# Patient Record
Sex: FEMALE | Race: WHITE | NOT HISPANIC OR LATINO | Employment: FULL TIME | URBAN - METROPOLITAN AREA
[De-identification: names, ages, dates, MRNs, and addresses within clinical notes are randomized per-mention and may not be internally consistent; named-entity substitution may affect disease eponyms.]

---

## 2022-06-02 ENCOUNTER — OFFICE VISIT (OUTPATIENT)
Dept: FAMILY MEDICINE | Facility: CLINIC | Age: 47
End: 2022-06-02
Payer: COMMERCIAL

## 2022-06-02 VITALS
HEIGHT: 59 IN | TEMPERATURE: 98 F | SYSTOLIC BLOOD PRESSURE: 138 MMHG | BODY MASS INDEX: 22.78 KG/M2 | OXYGEN SATURATION: 98 % | RESPIRATION RATE: 21 BRPM | WEIGHT: 113 LBS | HEART RATE: 85 BPM | DIASTOLIC BLOOD PRESSURE: 82 MMHG

## 2022-06-02 DIAGNOSIS — Z11.59 NEED FOR HEPATITIS C SCREENING TEST: ICD-10-CM

## 2022-06-02 DIAGNOSIS — Z12.31 VISIT FOR SCREENING MAMMOGRAM: ICD-10-CM

## 2022-06-02 DIAGNOSIS — Z12.4 CERVICAL CANCER SCREENING: ICD-10-CM

## 2022-06-02 DIAGNOSIS — R00.2 PALPITATIONS: ICD-10-CM

## 2022-06-02 DIAGNOSIS — Z13.220 SCREENING FOR HYPERLIPIDEMIA: ICD-10-CM

## 2022-06-02 DIAGNOSIS — Z13.29 SCREENING FOR ENDOCRINE, NUTRITIONAL, METABOLIC AND IMMUNITY DISORDER: ICD-10-CM

## 2022-06-02 DIAGNOSIS — Z00.00 VISIT FOR PREVENTIVE HEALTH EXAMINATION: Primary | ICD-10-CM

## 2022-06-02 DIAGNOSIS — B18.2 CHRONIC HEPATITIS C WITHOUT HEPATIC COMA (H): ICD-10-CM

## 2022-06-02 DIAGNOSIS — Z12.11 SCREEN FOR COLON CANCER: ICD-10-CM

## 2022-06-02 DIAGNOSIS — Z13.228 SCREENING FOR ENDOCRINE, NUTRITIONAL, METABOLIC AND IMMUNITY DISORDER: ICD-10-CM

## 2022-06-02 DIAGNOSIS — Z11.4 SCREENING FOR HIV (HUMAN IMMUNODEFICIENCY VIRUS): ICD-10-CM

## 2022-06-02 DIAGNOSIS — Z13.0 SCREENING FOR ENDOCRINE, NUTRITIONAL, METABOLIC AND IMMUNITY DISORDER: ICD-10-CM

## 2022-06-02 DIAGNOSIS — Z13.21 SCREENING FOR ENDOCRINE, NUTRITIONAL, METABOLIC AND IMMUNITY DISORDER: ICD-10-CM

## 2022-06-02 LAB
ALBUMIN SERPL-MCNC: 4.2 G/DL (ref 3.5–5)
ALP SERPL-CCNC: 70 U/L (ref 45–120)
ALT SERPL W P-5'-P-CCNC: 34 U/L (ref 0–45)
ANION GAP SERPL CALCULATED.3IONS-SCNC: 8 MMOL/L (ref 5–18)
AST SERPL W P-5'-P-CCNC: 27 U/L (ref 0–40)
BILIRUB SERPL-MCNC: 0.7 MG/DL (ref 0–1)
BUN SERPL-MCNC: 16 MG/DL (ref 8–22)
CALCIUM SERPL-MCNC: 9.4 MG/DL (ref 8.5–10.5)
CHLORIDE BLD-SCNC: 102 MMOL/L (ref 98–107)
CHOLEST SERPL-MCNC: 202 MG/DL
CO2 SERPL-SCNC: 30 MMOL/L (ref 22–31)
CREAT SERPL-MCNC: 0.81 MG/DL (ref 0.6–1.1)
ERYTHROCYTE [DISTWIDTH] IN BLOOD BY AUTOMATED COUNT: 12.3 % (ref 10–15)
FASTING STATUS PATIENT QL REPORTED: NO
GFR SERPL CREATININE-BSD FRML MDRD: 88 ML/MIN/1.73M2
GLUCOSE BLD-MCNC: 97 MG/DL (ref 70–125)
HCT VFR BLD AUTO: 39.6 % (ref 35–47)
HDLC SERPL-MCNC: 58 MG/DL
HGB BLD-MCNC: 13.3 G/DL (ref 11.7–15.7)
HIV 1+2 AB+HIV1 P24 AG SERPL QL IA: NEGATIVE
LDLC SERPL CALC-MCNC: 124 MG/DL
MCH RBC QN AUTO: 30.2 PG (ref 26.5–33)
MCHC RBC AUTO-ENTMCNC: 33.6 G/DL (ref 31.5–36.5)
MCV RBC AUTO: 90 FL (ref 78–100)
PLATELET # BLD AUTO: 280 10E3/UL (ref 150–450)
POTASSIUM BLD-SCNC: 4.3 MMOL/L (ref 3.5–5)
PROT SERPL-MCNC: 8 G/DL (ref 6–8)
RBC # BLD AUTO: 4.41 10E6/UL (ref 3.8–5.2)
SODIUM SERPL-SCNC: 140 MMOL/L (ref 136–145)
TRIGL SERPL-MCNC: 98 MG/DL
TSH SERPL DL<=0.005 MIU/L-ACNC: 2.48 UIU/ML (ref 0.3–5)
WBC # BLD AUTO: 8.3 10E3/UL (ref 4–11)

## 2022-06-02 PROCEDURE — 87491 CHLMYD TRACH DNA AMP PROBE: CPT | Performed by: FAMILY MEDICINE

## 2022-06-02 PROCEDURE — 87624 HPV HI-RISK TYP POOLED RSLT: CPT | Performed by: FAMILY MEDICINE

## 2022-06-02 PROCEDURE — 36415 COLL VENOUS BLD VENIPUNCTURE: CPT | Performed by: FAMILY MEDICINE

## 2022-06-02 PROCEDURE — 85027 COMPLETE CBC AUTOMATED: CPT | Performed by: FAMILY MEDICINE

## 2022-06-02 PROCEDURE — G0123 SCREEN CERV/VAG THIN LAYER: HCPCS | Performed by: FAMILY MEDICINE

## 2022-06-02 PROCEDURE — 86803 HEPATITIS C AB TEST: CPT | Performed by: FAMILY MEDICINE

## 2022-06-02 PROCEDURE — 80053 COMPREHEN METABOLIC PANEL: CPT | Performed by: FAMILY MEDICINE

## 2022-06-02 PROCEDURE — 99000 SPECIMEN HANDLING OFFICE-LAB: CPT | Performed by: FAMILY MEDICINE

## 2022-06-02 PROCEDURE — 87591 N.GONORRHOEAE DNA AMP PROB: CPT | Performed by: FAMILY MEDICINE

## 2022-06-02 PROCEDURE — 90471 IMMUNIZATION ADMIN: CPT | Performed by: FAMILY MEDICINE

## 2022-06-02 PROCEDURE — 87389 HIV-1 AG W/HIV-1&-2 AB AG IA: CPT | Performed by: FAMILY MEDICINE

## 2022-06-02 PROCEDURE — 80061 LIPID PANEL: CPT | Performed by: FAMILY MEDICINE

## 2022-06-02 PROCEDURE — 84443 ASSAY THYROID STIM HORMONE: CPT | Performed by: FAMILY MEDICINE

## 2022-06-02 PROCEDURE — 87902 NFCT AGT GNTYP ALYS HEP C: CPT | Mod: 90 | Performed by: FAMILY MEDICINE

## 2022-06-02 PROCEDURE — 99386 PREV VISIT NEW AGE 40-64: CPT | Mod: 25 | Performed by: FAMILY MEDICINE

## 2022-06-02 PROCEDURE — 90715 TDAP VACCINE 7 YRS/> IM: CPT | Performed by: FAMILY MEDICINE

## 2022-06-02 ASSESSMENT — ENCOUNTER SYMPTOMS
DIARRHEA: 0
HEADACHES: 0
BREAST MASS: 0
HEMATURIA: 0
SHORTNESS OF BREATH: 0
FREQUENCY: 1
NAUSEA: 0
HEARTBURN: 0
COUGH: 0
SORE THROAT: 0
PALPITATIONS: 1
CHILLS: 0
JOINT SWELLING: 0
ARTHRALGIAS: 0
DYSURIA: 0
EYE PAIN: 0
ABDOMINAL PAIN: 0
FEVER: 0
PARESTHESIAS: 0
DIZZINESS: 0
WEAKNESS: 0
CONSTIPATION: 1
NERVOUS/ANXIOUS: 0
MYALGIAS: 0
HEMATOCHEZIA: 0

## 2022-06-02 ASSESSMENT — PATIENT HEALTH QUESTIONNAIRE - PHQ9
10. IF YOU CHECKED OFF ANY PROBLEMS, HOW DIFFICULT HAVE THESE PROBLEMS MADE IT FOR YOU TO DO YOUR WORK, TAKE CARE OF THINGS AT HOME, OR GET ALONG WITH OTHER PEOPLE: NOT DIFFICULT AT ALL
SUM OF ALL RESPONSES TO PHQ QUESTIONS 1-9: 0
SUM OF ALL RESPONSES TO PHQ QUESTIONS 1-9: 0

## 2022-06-02 NOTE — LETTER
June 28, 2022      Yoli Diallo  [address] [place] [address]        Dear ,    We are writing to inform you of your test results.    Negative Cologuard test.  Repeat in 3 years.    Resulted Orders   COLOGUARD(EXACT SCIENCES)   Result Value Ref Range    COLOGUARD-ABSTRACT Negative Negative      Comment:        NEGATIVE TEST RESULT. A negative Cologuard result indicates a low likelihood that a colorectal cancer (CRC) or advanced adenoma (adenomatous polyps with more advanced pre-malignant features)  is present. The chance that a person with a negative Cologuard test has a colorectal cancer is less than 1 in 1500 (negative predictive value >99.9%) or has an  advanced adenoma is less than  5.3% (negative predictive value 94.7%). These data are based on a prospective cross-sectional study of 10,000 individuals at average risk for colorectal cancer who were screened with both Cologuard and colonoscopy. (Lovely DOOLEY. et al, N Engl J Med 2014;370(14):4998-7191) The normal value (reference range) for this assay is negative.    COLOGUARD RE-SCREENING RECOMMENDATION: Periodic colorectal cancer screening is an important part of preventive healthcare for asymptomatic individuals at average risk for colorectal cancer.  Following a negative Cologuard result, the American Cancer Society and U.S.  Multi-Society Task Force screening guidelines recommend a Cologuard re-screening interval of 3 years.   References: American Cancer Society Guideline for Colorectal Cancer Screening: https://www.cancer.org/cancer/colon-rectal-cancer/uiebpiyar-tzaqffwal-xmyohco/acs-recommendations.html.; Dilan VARELA, Daniel HINOJOSA, Italo OLIVARESK, Colorectal Cancer Screening: Recommendations for Physicians and Patients from the U.S. Multi-Society Task Force on Colorectal Cancer Screening , Am J Gastroenterology 2017; 112:2481-5081.    TEST DESCRIPTION: Composite algorithmic analysis of stool DNA-biomarkers with hemoglobin immunoassay.    Quantitative values of individual biomarkers are not reportable and are not associated with individual biomarker result reference ranges. Cologuard is intended for colorectal cancer screening of adults of either sex, 45 years or older, who are at average-risk for colorectal cancer (CRC). Cologuard has been approved for use by the U.S. FDA. The performance of Cologuard was  established in a cross sectional study of average-risk adults aged 50-84. Cologuard performance in patients ages 45 to 49 years was estimated by sub-group analysis of near-age groups. Colonoscopies performed for a positive result may find as the most clinically significant lesion: colorectal cancer [4.0%], advanced adenoma (including sessile serrated polyps greater than or equal to 1cm diameter) [20%] or non- advanced adenoma [31%]; or no colorectal neoplasia [45%]. These estimates are derived from a prospective cross-sectional screening study of 10,000 individuals at average risk for colorectal cancer who were screened with both Cologuard and colonoscopy. (Lovely ABREU et al, N Engl J Med 2014;370(14):9374-7851.) Cologuard may produce a false negative or false positive result (no colorectal cancer or precancerous polyp present at colonoscopy follow up). A negative Cologuard test result does not guarantee the absence of CRC or advanced adenoma (pre-cancer). The current Cologuard  screening interval is every 3 years. (American Cancer Society and U.S. Multi-Society Task Force). Cologuard performance data in a 10,000 patient pivotal study using colonoscopy as the reference method can be accessed at the following location: [email or web address].AddFleet/results. Additional description of the Cologuard test process, warnings and precautions can be found at www.PEMREDrd.com.     HIV Antigen Antibody Combo   Result Value Ref Range    HIV Antigen Antibody Combo Negative Negative   Hepatitis C Screen Reflex to HCV RNA Quant and Genotype   Result Value Ref Range     Hepatitis C Antibody Reactive (A) Nonreactive      Comment:      A reactive result indicates one of the following   1) Current HCV infection   2) Past HCV infection that has resolved or   3) False positivity.     The CDC recommends that a reactive result should be followed by Nucleic acid testing for HCV RNA. If HCV RNA is detected, that indicates current HCV infection.   If HCV RNA is not detected, that indicates either past, resolved HCV infection, or false HCV antibody positivity.    Narrative    Assay performance characteristics have not been established for newborns, infants, and children.   Lipid panel reflex to direct LDL Fasting   Result Value Ref Range    Cholesterol 202 (H) <=199 mg/dL    Triglycerides 98 <=149 mg/dL    Direct Measure HDL 58 >=50 mg/dL      Comment:      HDL Cholesterol Reference Range:     0-2 years:   No reference ranges established for patients under 2 years old  at [facility] [employer] for lipid analytes.    2-8 years:  Greater than 45 mg/dL     18 years and older:   Female: Greater than or equal to 50 mg/dL   Male:   Greater than or equal to 40 mg/dL    LDL Cholesterol Calculated 124 <=129 mg/dL    Patient Fasting > 8hrs? No    Comprehensive metabolic panel (BMP + Alb, Alk Phos, ALT, AST, Total. Bili, TP)   Result Value Ref Range    Sodium 140 136 - 145 mmol/L    Potassium 4.3 3.5 - 5.0 mmol/L    Chloride 102 98 - 107 mmol/L    Carbon Dioxide (CO2) 30 22 - 31 mmol/L    Anion Gap 8 5 - 18 mmol/L    Urea Nitrogen 16 8 - 22 mg/dL    Creatinine 0.81 0.60 - 1.10 mg/dL    Calcium 9.4 8.5 - 10.5 mg/dL    Glucose 97 70 - 125 mg/dL    Alkaline Phosphatase 70 45 - 120 U/L    AST 27 0 - 40 U/L    ALT 34 0 - 45 U/L    Protein Total 8.0 6.0 - 8.0 g/dL    Albumin 4.2 3.5 - 5.0 g/dL    Bilirubin Total 0.7 0.0 - 1.0 mg/dL    GFR Estimate 88 >60 mL/min/1.73m2      Comment:      Effective December 21, 2021 eGFRcr in adults is calculated using the 2021 CKD-EPI creatinine equation which includes age  and gender (Gary redman al., [facility], DOI: 10.1056/TEQDeb3339877)   CBC with platelets   Result Value Ref Range    WBC Count 8.3 4.0 - 11.0 10e3/uL    RBC Count 4.41 3.80 - 5.20 10e6/uL    Hemoglobin 13.3 11.7 - 15.7 g/dL    Hematocrit 39.6 35.0 - 47.0 %    MCV 90 78 - 100 fL    MCH 30.2 26.5 - 33.0 pg    MCHC 33.6 31.5 - 36.5 g/dL    RDW 12.3 10.0 - 15.0 %    Platelet Count 280 150 - 450 10e3/uL   TSH   Result Value Ref Range    TSH 2.48 0.30 - 5.00 uIU/mL   Chlamydia & Gonorrhea by PCR, [facility]/Range - Clinic Collect   Result Value Ref Range    Chlamydia Trachomatis Negative Negative      Comment:      Negative for C. trachomatis rRNA by transcription mediated amplification.   A negative result by transcription mediated amplification does not preclude the presence of infection because results are dependent on proper and adequate collection, absence of inhibitors and sufficient rRNA to be detected.    Neisseria gonorrhoeae Negative Negative      Comment:      Negative for N. gonorrhoeae rRNA by transcription mediated amplification. A negative result by transcription mediated amplification does not preclude the presence of C. trachomatis infection because results are dependent on proper and adequate collection, absence of inhibitors and sufficient rRNA to be detected.   Hepatitis C RNA, Quantitative by PCR with Confirmatory Reflex to Genotyping   Result Value Ref Range    Hepatitis C log 7.2     Hepatitis C RNA IU/mL, Instrument [id number] (H) <1 IU/mL    Narrative    The NICHOLAS AmpliPrep/NICHOLAS TaqMan HCV test is a FDA-approved in vitro nucleic acid amplification test for the quantitation of HCV DNA in human plasma (EDTA plasma) or serum using the NICHOLAS AmpliPrep instrument for automated viral nucleic acid extraction and the NICHOLAS TaqMan for the automated real-time PCR amplification and detection of viral nucleic acid target. Titer results are reported in International Units/mL (IU/mL) using the 1st WHO International standard  for HBV for nucleic acid amplification assays.   Hepatitis C High Resolution Genotype   Result Value Ref Range    Hepatitis C High Resolution 6m       Comment:      INTERPRETIVE INFORMATION: Hepatitis C High Resolution   Genotype     Hepatitis C viral RNA is assayed using reverse   transcription polymerase chain reaction (RT-PCR) to amplify   specific portions of both the Core and NS5B regions of the   viral genome. The amplified nucleic acid is sequenced   bi-directionally using dye-terminator chemistry (Tello).   Sequencing data is compared to a database of characterized   sequences.     Isolates of hepatitis C virus are grouped into six major   genotypes(1-6). These genotypes are subtyped according to   sequence characteristics. Sequencing both the Core and NS5B   regions allows for subtyping of all confirmed and most   provisional genotypes, including differentiation of 1a from   1b and typing of genotype 6.    This test was developed and its performance characteristics   determined by [facility]. It has not been cleared or   approved by the US Food and Drug Administration. This test   was performed in a CLIA certified laboratory and is    intended for clinical purposes.  Performed By: [facility]  [address] [place] [id number]  : Karlee Medina MD       If you have any questions or concerns, please call the clinic at the number listed above.       Sincerely,      Wanda Hankins MD

## 2022-06-02 NOTE — PROGRESS NOTES
SUBJECTIVE:   CC: Yoli NAYLOR Imani is an 47 year old woman who presents for preventive health visit.       Patient has been advised of split billing requirements and indicates understanding: Yes  Healthy Habits:     Getting at least 3 servings of Calcium per day:  Yes    Bi-annual eye exam:  Yes    Dental care twice a year:  NO    Sleep apnea or symptoms of sleep apnea:  None    Diet:  Other    Frequency of exercise:  None    Taking medications regularly:  Yes    Medication side effects:  None    PHQ-2 Total Score: 3    Additional concerns today:  No            PROBLEMS TO ADD ON...  New patient, she is here today for a general physical, she is a migrant from [place] to [facility], she did spend some time in [place],  this is the first visit to visit a doctor's office, she does mention occasional palpitation but not all the time.  Overall doing fine.  She still  Mom and dad  of possible heart problem.  She is a non-smoker, does not drink alcohol or do illicit drug, age-appropriate healthcare maintenance discussed.  Today's PHQ-2 Score:   PHQ-2 (  [employer]) 2022   Q1: Little interest or pleasure in doing things 3   Q2: Feeling down, depressed or hopeless 0   PHQ-2 Score 3   Q1: Little interest or pleasure in doing things Not at all   Q2: Feeling down, depressed or hopeless Not at all   PHQ-2 Score 0       Abuse: Current or Past (Physical, Sexual or Emotional) - No  Do you feel safe in your environment? Yes    Have you ever done Advance Care Planning? (For example, a Health Directive, POLST, or a discussion with a medical provider or your loved ones about your wishes): No, advance care planning information given to patient to review.  Patient declined advance care planning discussion at this time.    Social History     Tobacco Use     Smoking status: Never Smoker     Smokeless tobacco: Never Used   Substance Use Topics     Alcohol use: Never     If you drink alcohol do you typically have >3 drinks per day or >7  drinks per week? Not applicable    Alcohol Use 6/2/2022   Prescreen: >3 drinks/day or >7 drinks/week? Not Applicable       Reviewed orders with patient.  Reviewed health maintenance and updated orders accordingly - Yes  Lab work is in process  Labs reviewed in EPIC  BP Readings from Last 3 Encounters:   06/02/22 138/82    Wt Readings from Last 3 Encounters:   06/02/22 51.3 kg (113 lb)                  There is no problem list on file for this patient.    No past surgical history on file.    Social History     Tobacco Use     Smoking status: Never Smoker     Smokeless tobacco: Never Used   Substance Use Topics     Alcohol use: Never     No family history on file.      No current outpatient medications on file.     No Known Allergies  Recent Labs   Lab Test 06/02/22  1219      HDL 58   TRIG 98   ALT 34   CR 0.81   GFRESTIMATED 88   POTASSIUM 4.3   TSH 2.48        Breast Cancer Screening:  Any new diagnosis of family breast, ovarian, or bowel cancer? No    FHS-7: No flowsheet data found.    Mammogram Screening: Recommended annual mammography  Pertinent mammograms are reviewed under the imaging tab.    History of abnormal Pap smear: NO - age 30-65 PAP every 5 years with negative HPV co-testing recommended     Reviewed and updated as needed this visit by clinical staff   Tobacco  Allergies  Meds      Soc Hx          Reviewed and updated as needed this visit by Provider                   No past medical history on file.   No past surgical history on file.  OB History   No obstetric history on file.       Review of Systems  CONSTITUTIONAL: NEGATIVE for fever, chills, change in weight  INTEGUMENTARU/SKIN: NEGATIVE for worrisome rashes, moles or lesions  EYES: NEGATIVE for vision changes or irritation  ENT: NEGATIVE for ear, mouth and throat problems  RESP: NEGATIVE for significant cough or SOB  BREAST: NEGATIVE for masses, tenderness or discharge  CV: NEGATIVE for chest pain, palpitations or peripheral edema  GI:  "NEGATIVE for nausea, abdominal pain, heartburn, or change in bowel habits  : NEGATIVE for unusual urinary or vaginal symptoms. Periods are regular.  MUSCULOSKELETAL: NEGATIVE for significant arthralgias or myalgia  NEURO: NEGATIVE for weakness, dizziness or paresthesias  PSYCHIATRIC: NEGATIVE for changes in mood or affect     OBJECTIVE:   /82 (BP Location: Left arm, Patient Position: Sitting, Cuff Size: Adult Regular)   Pulse 85   Temp 98  F (36.7  C) (Temporal)   Resp 21   Ht 1.503 m (4' 11.17\")   Wt 51.3 kg (113 lb)   SpO2 98%   BMI 22.69 kg/m    Physical Exam  GENERAL: healthy, alert and no distress  EYES: Eyes grossly normal to inspection, PERRL and conjunctivae and sclerae normal  HENT: ear canals and TM's normal, nose and mouth without ulcers or lesions  NECK: no adenopathy, no asymmetry, masses, or scars and thyroid normal to palpation  RESP: lungs clear to auscultation - no rales, rhonchi or wheezes  BREAST: declined  CV: regular rate and rhythm, normal S1 S2, no S3 or S4, no murmur, click or rub, no peripheral edema and peripheral pulses strong  ABDOMEN: soft, nontender, no hepatosplenomegaly, no masses and bowel sounds normal   (female): normal female external genitalia, normal urethral meatus, vaginal mucosa pink, moist, well rugated, and normal cervix/adnexa/uterus without masses or discharge, exam chaperoned by Ezequiel, Female CNA  MS: no gross musculoskeletal defects noted, no edema  SKIN: no suspicious lesions or rashes  NEURO: Normal strength and tone, mentation intact and speech normal  PSYCH: mentation appears normal, affect normal/bright    Diagnostic Test Results:  Labs reviewed in Epic    ASSESSMENT/PLAN:   (Z00.00) Visit for preventive health examination  (primary encounter diagnosis)  Comment:   Plan:     (Z12.31) Visit for screening mammogram  Comment:   Plan: MA SCREENING DIGITAL BILAT - Future  (s+30)            (Z12.11) Screen for colon cancer  Comment:   Plan: COLOGUARD(EXACT " "SCIENCES)            (Z11.4) Screening for HIV (human immunodeficiency virus)  Comment:   Plan: HIV Antigen Antibody Combo, Chlamydia &         Gonorrhea by PCR, [facility] - Clinic Collect            (Z11.59) Need for hepatitis C screening test  Comment:   Plan: Hepatitis C Screen Reflex to HCV RNA Quant and         Genotype            (Z12.4) Cervical cancer screening  Comment:   Plan: Pap Screen with HPV - recommended age 30 - 65         years, HPV High Risk Types DNA Cervical            (Z13.220) Screening for hyperlipidemia  Comment:   Plan: Lipid panel reflex to direct LDL Fasting            (Z13.29,  Z13.21,  Z13.228,  Z13.0) Screening for endocrine, nutritional, metabolic and immunity disorder  Comment:   Plan: Comprehensive metabolic panel (BMP + Alb, Alk         Phos, ALT, AST, Total. Bili, TP), CBC with         platelets, TSH            (R00.2) Palpitations  Comment:   Plan: Avoid irritant substances (caffeine tea etc.), follow-up in 4 weeks if still not improved.    Patient has been advised of split billing requirements and indicates understanding: Yes    COUNSELING:  Reviewed preventive health counseling, as reflected in patient instructions       Regular exercise       Healthy diet/nutrition       Vision screening       Hearing screening    Estimated body mass index is 22.69 kg/m  as calculated from the following:    Height as of this encounter: 1.503 m (4' 11.17\").    Weight as of this encounter: 51.3 kg (113 lb).        She reports that she has never smoked. She has never used smokeless tobacco.      Counseling Resources:  ATP IV Guidelines  Pooled Cohorts Equation Calculator  Breast Cancer Risk Calculator  BRCA-Related Cancer Risk Assessment: FHS-7 Tool  FRAX Risk Assessment  ICSI Preventive Guidelines  Dietary Guidelines for Americans, 2010  USDA's MyPlate  ASA Prophylaxis  Lung CA Screening    Wanda Hankins MD  [facility]      Answers for HPI/ROS submitted by the patient " on 6/2/2022  If you checked off any problems, how difficult have these problems made it for you to do your work, take care of things at home, or get along with other people?: Not difficult at all  PHQ9 TOTAL SCORE: 0

## 2022-06-03 LAB — HCV AB SERPL QL IA: REACTIVE

## 2022-06-04 LAB
C TRACH DNA SPEC QL PROBE+SIG AMP: NEGATIVE
N GONORRHOEA DNA SPEC QL NAA+PROBE: NEGATIVE

## 2022-06-06 LAB
HCV RNA SERPL NAA+PROBE-ACNC: ABNORMAL IU/ML
HCV RNA SERPL NAA+PROBE-LOG IU: 7.2 {LOG_IU}/ML

## 2022-06-07 LAB
HUMAN PAPILLOMA VIRUS 16 DNA: NEGATIVE
HUMAN PAPILLOMA VIRUS 18 DNA: NEGATIVE
HUMAN PAPILLOMA VIRUS FINAL DIAGNOSIS: NORMAL
HUMAN PAPILLOMA VIRUS OTHER HR: NEGATIVE

## 2022-06-09 ENCOUNTER — TELEPHONE (OUTPATIENT)
Dept: FAMILY MEDICINE | Facility: CLINIC | Age: 47
End: 2022-06-09
Payer: COMMERCIAL

## 2022-06-09 LAB
BKR LAB AP GYN ADEQUACY: NORMAL
BKR LAB AP GYN INTERPRETATION: NORMAL
BKR LAB AP GYN OTHER FINDINGS: NORMAL
BKR LAB AP HPV REFLEX: NORMAL
BKR LAB AP PREVIOUS ABNORMAL: NORMAL
PATH REPORT.COMMENTS IMP SPEC: NORMAL
PATH REPORT.COMMENTS IMP SPEC: NORMAL
PATH REPORT.RELEVANT HX SPEC: NORMAL

## 2022-06-09 NOTE — TELEPHONE ENCOUNTER
Called pt and unable to leave a voice mail           ----- Message from Wanda Hankins MD sent at 6/7/2022  9:06 AM CDT -----  Please inform patient that recent test did show that she had a chronic liver infection caused by hepatitis C, hepatitis C is a virus that can be transmitted through blood, human secretion etc. if left untreated that can cause liver cirrhosis and death.  I will place a referral to see a gastroenterologist to start a treatment, expect a call to schedule an appointment.

## 2022-06-09 NOTE — LETTER
June 16, 2022      Yoli NAYLOR Imani  [address] [place] [address]        Dear Ms.in,    We are writing to inform you of your test results.    Your recent test did show that you had a chronic liver infection caused by hepatitis C, hepatitis C is a virus that can be transmitted through blood, human secretion etc. If left untreated that can cause liver cirrhosis and death.  Dr. Hankins will place a referral to see a gastroenterologist to start a treatment, expect a call to schedule an appointment.    You have been referred to [facility] and the phone number is [phone]. If you have not received a call from them, please call [facility] at your earliest convenience to make an appointment with them.        Resulted Orders   HIV Antigen Antibody Combo   Result Value Ref Range    HIV Antigen Antibody Combo Negative Negative   Hepatitis C Screen Reflex to HCV RNA Quant and Genotype   Result Value Ref Range    Hepatitis C Antibody Reactive (A) Nonreactive      Comment:      A reactive result indicates one of the following   1) Current HCV infection   2) Past HCV infection that has resolved or   3) False positivity.     The CDC recommends that a reactive result should be followed by Nucleic acid testing for HCV RNA. If HCV RNA is detected, that indicates current HCV infection.   If HCV RNA is not detected, that indicates either past, resolved HCV infection, or false HCV antibody positivity.    Narrative    Assay performance characteristics have not been established for newborns, infants, and children.   Lipid panel reflex to direct LDL Fasting   Result Value Ref Range    Cholesterol 202 (H) <=199 mg/dL    Triglycerides 98 <=149 mg/dL    Direct Measure HDL 58 >=50 mg/dL      Comment:      HDL Cholesterol Reference Range:     0-2 years:   No reference ranges established for patients under 2 years old  at [facility] [employer] for lipid analytes.    2-8 years:  Greater than 45 mg/dL     18 years and older:    Female: Greater than or equal to 50 mg/dL   Male:   Greater than or equal to 40 mg/dL    LDL Cholesterol Calculated 124 <=129 mg/dL    Patient Fasting > 8hrs? No    Comprehensive metabolic panel (BMP + Alb, Alk Phos, ALT, AST, Total. Bili, TP)   Result Value Ref Range    Sodium 140 136 - 145 mmol/L    Potassium 4.3 3.5 - 5.0 mmol/L    Chloride 102 98 - 107 mmol/L    Carbon Dioxide (CO2) 30 22 - 31 mmol/L    Anion Gap 8 5 - 18 mmol/L    Urea Nitrogen 16 8 - 22 mg/dL    Creatinine 0.81 0.60 - 1.10 mg/dL    Calcium 9.4 8.5 - 10.5 mg/dL    Glucose 97 70 - 125 mg/dL    Alkaline Phosphatase 70 45 - 120 U/L    AST 27 0 - 40 U/L    ALT 34 0 - 45 U/L    Protein Total 8.0 6.0 - 8.0 g/dL    Albumin 4.2 3.5 - 5.0 g/dL    Bilirubin Total 0.7 0.0 - 1.0 mg/dL    GFR Estimate 88 >60 mL/min/1.73m2      Comment:      Effective December 21, 2021 eGFRcr in adults is calculated using the 2021 CKD-EPI creatinine equation which includes age and gender (Gary et al., [place], DOI: 10.1056/GQRYnx6192996)   CBC with platelets   Result Value Ref Range    WBC Count 8.3 4.0 - 11.0 10e3/uL    RBC Count 4.41 3.80 - 5.20 10e6/uL    Hemoglobin 13.3 11.7 - 15.7 g/dL    Hematocrit 39.6 35.0 - 47.0 %    MCV 90 78 - 100 fL    MCH 30.2 26.5 - 33.0 pg    MCHC 33.6 31.5 - 36.5 g/dL    RDW 12.3 10.0 - 15.0 %    Platelet Count 280 150 - 450 10e3/uL   TSH   Result Value Ref Range    TSH 2.48 0.30 - 5.00 uIU/mL   Chlamydia & Gonorrhea by PCR, [facility]/Range - Clinic Collect   Result Value Ref Range    Chlamydia Trachomatis Negative Negative      Comment:      Negative for C. trachomatis rRNA by transcription mediated amplification.   A negative result by transcription mediated amplification does not preclude the presence of infection because results are dependent on proper and adequate collection, absence of inhibitors and sufficient rRNA to be detected.    Neisseria gonorrhoeae Negative Negative      Comment:      Negative for N. gonorrhoeae rRNA by transcription  mediated amplification. A negative result by transcription mediated amplification does not preclude the presence of C. trachomatis infection because results are dependent on proper and adequate collection, absence of inhibitors and sufficient rRNA to be detected.   Hepatitis C RNA, Quantitative by PCR with Confirmatory Reflex to Genotyping   Result Value Ref Range    Hepatitis C log 7.2     Hepatitis C RNA IU/mL, Instrument [id number] (H) <1 IU/mL    Narrative    The NICHOLAS AmpliPrep/NICHOLAS TaqMan HCV test is a FDA-approved in vitro nucleic acid amplification test for the quantitation of HCV DNA in human plasma (EDTA plasma) or serum using the NICHOLAS AmpliPrep instrument for automated viral nucleic acid extraction and the NICHOLAS TaqMan for the automated real-time PCR amplification and detection of viral nucleic acid target. Titer results are reported in International Units/mL (IU/mL) using the 1st WHO International standard for HBV for nucleic acid amplification assays.       If you have any questions or concerns, please call the clinic at the number listed above.       Sincerely,        [facility]

## 2022-06-14 LAB — HCV GENTYP SERPL NAA+PROBE: NORMAL

## 2022-06-16 ENCOUNTER — TELEPHONE (OUTPATIENT)
Dept: FAMILY MEDICINE | Facility: CLINIC | Age: 47
End: 2022-06-16

## 2022-06-16 NOTE — TELEPHONE ENCOUNTER
RN called [facility] to obtain the fax number.       Fax number for the [facility] is [phone].      RN will fax the related lab result to [facility].       Route to  as ARAM.            Olga Dyer RN  [facility]

## 2022-06-16 NOTE — TELEPHONE ENCOUNTER
----- Message from Wanda Hankins MD sent at 6/14/2022  9:44 AM CDT -----  Please fax result to [facility] and inform  patient that she should follow-up with them.    We could also mail the results and the appointment date with GI  to her if not answering her phone.  Thank you

## 2022-06-16 NOTE — TELEPHONE ENCOUNTER
RN made 2nd attempt to call patient to relay 's message below.     Patient did not answer. Unable to leave message.     RN will mail the lab results along with [facility] phone number to patient.         Olga Dyer RN  [facility]

## 2022-06-28 LAB — NONINV COLON CA DNA+OCC BLD SCRN STL QL: NEGATIVE

## 2022-07-18 ENCOUNTER — OFFICE VISIT (OUTPATIENT)
Dept: FAMILY MEDICINE | Facility: CLINIC | Age: 47
End: 2022-07-18
Payer: COMMERCIAL

## 2022-07-18 VITALS
BODY MASS INDEX: 22.59 KG/M2 | RESPIRATION RATE: 16 BRPM | SYSTOLIC BLOOD PRESSURE: 125 MMHG | DIASTOLIC BLOOD PRESSURE: 82 MMHG | HEART RATE: 105 BPM | TEMPERATURE: 98.7 F | WEIGHT: 112.5 LBS

## 2022-07-18 DIAGNOSIS — I49.9 IRREGULAR HEARTBEAT: Primary | ICD-10-CM

## 2022-07-18 DIAGNOSIS — B18.2 CHRONIC HEPATITIS C WITHOUT HEPATIC COMA (H): ICD-10-CM

## 2022-07-18 LAB
ALBUMIN SERPL BCG-MCNC: 4.5 G/DL (ref 3.5–5.2)
ALP SERPL-CCNC: 99 U/L (ref 35–104)
ALT SERPL W P-5'-P-CCNC: 83 U/L (ref 10–35)
AST SERPL W P-5'-P-CCNC: 70 U/L (ref 10–35)
BILIRUB DIRECT SERPL-MCNC: <0.2 MG/DL (ref 0–0.3)
BILIRUB SERPL-MCNC: 0.6 MG/DL
HAV IGG SER QL IA: REACTIVE
HBV SURFACE AB SERPL IA-ACNC: 152.29 M[IU]/ML
HBV SURFACE AG SERPL QL IA: NONREACTIVE
PROT SERPL-MCNC: 7.7 G/DL (ref 6.4–8.3)

## 2022-07-18 PROCEDURE — 36415 COLL VENOUS BLD VENIPUNCTURE: CPT | Performed by: FAMILY MEDICINE

## 2022-07-18 PROCEDURE — 86706 HEP B SURFACE ANTIBODY: CPT | Performed by: FAMILY MEDICINE

## 2022-07-18 PROCEDURE — 80076 HEPATIC FUNCTION PANEL: CPT | Performed by: FAMILY MEDICINE

## 2022-07-18 PROCEDURE — 87340 HEPATITIS B SURFACE AG IA: CPT | Performed by: FAMILY MEDICINE

## 2022-07-18 PROCEDURE — 99214 OFFICE O/P EST MOD 30 MIN: CPT | Performed by: FAMILY MEDICINE

## 2022-07-18 PROCEDURE — 86708 HEPATITIS A ANTIBODY: CPT | Performed by: FAMILY MEDICINE

## 2022-07-18 NOTE — PROGRESS NOTES
Assessment & Plan     Irregular heartbeat    - Adult Holter Monitor 24 hour; Future    Chronic hepatitis C without hepatic coma (H)    - Adult GI  Referral - Consult Only; Future  - Hepatitis B surface antigen; Future  - Hepatitis B Surface Antibody; Future  - Hepatitis Antibody A IgG; Future  - **Hepatic panel FUTURE 2mo; Future  - Hepatitis B surface antigen  - Hepatitis B Surface Antibody  - Hepatitis Antibody A IgG  - **Hepatic panel FUTURE 2mo  Irregular heartbeat, we are scheduling Holter monitor, consider referral to cardiologist.  Chronic hep C, new referral to GI was placed, she stating that she did not get a call to schedule from previous referral, also checking immunity against hep A and B.  Epigastric pain likely GERD,  Review of external notes as documented elsewhere in note  23 minutes spent on the date of the encounter doing chart review, review of outside records, review of test results, interpretation of tests, patient visit and documentation            No follow-ups on file.    Wanda Hankins MD  [facility]    Bassem Kent is a 47 year old accompanied by her brother, presenting for the following health issues:  RECHECK      History of Present Illness       Vascular Disease:  She presents for follow up of vascular disease.  She never takes nitroglycerin. She is not taking daily aspirin.    She eats 0-1 servings of fruits and vegetables daily.She consumes 0 sweetened beverage(s) daily.She exercises with enough effort to increase her heart rate 9 or less minutes per day.  She exercises with enough effort to increase her heart rate 3 or less days per week.   She is taking medications regularly.       She still experiencing episode of irregular heartbeat, few times a week, no chest pain or shortness of breath or dizziness.  Has been present for several months, no specific trigger that patient is aware of.  Had a physical a few months ago was diagnosed with  hepatitis C on referral GI, she stating that she did not receive any call to schedule an appointment.  Occasional epigastric pain, no nausea or vomiting.  Has not tried any medicine.    Review of Systems   Constitutional, HEENT, cardiovascular, pulmonary, gi and gu systems are negative, except as otherwise noted.      Objective    /82 (BP Location: Left arm, Patient Position: Sitting, Cuff Size: Adult Regular)   Pulse 105   Temp 98.7  F (37.1  C) (Tympanic)   Resp 16   Wt 51 kg (112 lb 8 oz)   BMI 22.59 kg/m    Body mass index is 22.59 kg/m .  Physical Exam   GENERAL: healthy, alert and no distress  NECK: no adenopathy, no asymmetry, masses, or scars and thyroid normal to palpation  RESP: lungs clear to auscultation - no rales, rhonchi or wheezes  CV: regular rate and rhythm, normal S1 S2, no S3 or S4, no murmur, click or rub, no peripheral edema and peripheral pulses strong  ABDOMEN: soft, nontender, no hepatosplenomegaly, no masses and bowel sounds normal  MS: no gross musculoskeletal defects noted, no edema    Office Visit on 06/02/2022   Component Date Value Ref Range Status     Interpretation 06/02/2022 Negative for Intraepithelial Lesion or Malignancy (NILM)    Final     Other Findings 06/02/2022 Shift in vaginal wayne suggestive of bacterial vaginosis  Endometrial cells in a woman >=45 years of age; endometrial cells correlate with menstrual history provided, Trichomonas vaginalis, Fungal organisms morphologically consistent with Candida spp, Shift in vaginal wayne suggestive of bacterial vaginosis,... Final     Comment 06/02/2022    Final                    Value:This result contains rich text formatting which cannot be displayed here.     Specimen Adequacy 06/02/2022 Satisfactory for evaluation, endocervical/transformation zone component absent   Final     Clinical Information 06/02/2022    Final                    Value:This result contains rich text formatting which cannot be displayed here.      Reflex Testing 06/02/2022 Yes regardless of result   Final     Previous Abnormal? 06/02/2022    Final                    Value:This result contains rich text formatting which cannot be displayed here.     Performing Labs 06/02/2022    Final                    Value:This result contains rich text formatting which cannot be displayed here.     COLOGUARD-ABSTRACT 06/22/2022 Negative  Negative Final    Comment:   NEGATIVE TEST RESULT. A negative Cologuard result indicates a low likelihood that a colorectal cancer (CRC) or advanced adenoma (adenomatous polyps with more advanced pre-malignant features)  is present. The chance that a person with a negative Cologuard test has a colorectal cancer is less than 1 in 1500 (negative predictive value >99.9%) or has an  advanced adenoma is less than  5.3% (negative predictive value 94.7%). These data are based on a prospective cross-sectional study of 10,000 individuals at average risk for colorectal cancer who were screened with both Cologuard and colonoscopy. (Lovely ABREU et al, N Engl J Med 2014;370(14):1834-3971) The normal value (reference range) for this assay is negative.    COLOGUARD RE-SCREENING RECOMMENDATION: Periodic colorectal cancer screening is an important part of preventive healthcare for asymptomatic individuals at average risk for colorectal cancer.  Following a negative Cologuard result, the American Cancer Society and U.S.                            Multi-Society Task Force screening guidelines recommend a Cologuard re-screening interval of 3 years.   References: American Cancer Society Guideline for Colorectal Cancer Screening: https://www.cancer.org/cancer/colon-rectal-cancer/mpgrmhcbp-sqhwpwmgi-lpheixk/acs-recommendations.html.; Dilan VARELA, Daniel HINOJOSA, Italo OLIVARESK, Colorectal Cancer Screening: Recommendations for Physicians and Patients from the U.S. Multi-Society Task Force on Colorectal Cancer Screening , Am J Gastroenterology 2017; 112:3857-4646.    TEST  DESCRIPTION: Composite algorithmic analysis of stool DNA-biomarkers with hemoglobin immunoassay.   Quantitative values of individual biomarkers are not reportable and are not associated with individual biomarker result reference ranges. Cologuard is intended for colorectal cancer screening of adults of either sex, 45 years or older, who are at average-risk for colorectal cancer (CRC). Cologuard has been approved for use by the U.S. FDA. The performance of Cologuard was                            established in a cross sectional study of average-risk adults aged 50-84. Cologuard performance in patients ages 45 to 49 years was estimated by sub-group analysis of near-age groups. Colonoscopies performed for a positive result may find as the most clinically significant lesion: colorectal cancer [4.0%], advanced adenoma (including sessile serrated polyps greater than or equal to 1cm diameter) [20%] or non- advanced adenoma [31%]; or no colorectal neoplasia [45%]. These estimates are derived from a prospective cross-sectional screening study of 10,000 individuals at average risk for colorectal cancer who were screened with both Cologuard and colonoscopy. (Lovely ABREU et al, N Engl J Med 2014;370(14):4340-2172.) Cologuard may produce a false negative or false positive result (no colorectal cancer or precancerous polyp present at colonoscopy follow up). A negative Cologuard test result does not guarantee the absence of CRC or advanced adenoma (pre-cancer). The current Cologuard                            screening interval is every 3 years. (American Cancer Society and U.S. Multi-Society Task Force). Cologuard performance data in a 10,000 patient pivotal study using colonoscopy as the reference method can be accessed at the following location: www.[email or web address]/results. Additional description of the Cologuard test process, warnings and precautions can be found at www.[employer].com.       HIV Antigen Antibody Combo  06/02/2022 Negative  Negative Final     Hepatitis C Antibody 06/02/2022 Reactive (A) Nonreactive Final    A reactive result indicates one of the following   1) Current HCV infection   2) Past HCV infection that has resolved or   3) False positivity.     The CDC recommends that a reactive result should be followed by Nucleic acid testing for HCV RNA. If HCV RNA is detected, that indicates current HCV infection.   If HCV RNA is not detected, that indicates either past, resolved HCV infection, or false HCV antibody positivity.     Cholesterol 06/02/2022 202 (A) <=199 mg/dL Final     Triglycerides 06/02/2022 98  <=149 mg/dL Final     Direct Measure HDL 06/02/2022 58  >=50 mg/dL Final    HDL Cholesterol Reference Range:     0-2 years:   No reference ranges established for patients under 2 years old  at [facility] for lipid analytes.    2-8 years:  Greater than 45 mg/dL     18 years and older:   Female: Greater than or equal to 50 mg/dL   Male:   Greater than or equal to 40 mg/dL     LDL Cholesterol Calculated 06/02/2022 124  <=129 mg/dL Final     Patient Fasting > 8hrs? 06/02/2022 No   Final     Sodium 06/02/2022 140  136 - 145 mmol/L Final     Potassium 06/02/2022 4.3  3.5 - 5.0 mmol/L Final     Chloride 06/02/2022 102  98 - 107 mmol/L Final     Carbon Dioxide (CO2) 06/02/2022 30  22 - 31 mmol/L Final     Anion Gap 06/02/2022 8  5 - 18 mmol/L Final     Urea Nitrogen 06/02/2022 16  8 - 22 mg/dL Final     Creatinine 06/02/2022 0.81  0.60 - 1.10 mg/dL Final     Calcium 06/02/2022 9.4  8.5 - 10.5 mg/dL Final     Glucose 06/02/2022 97  70 - 125 mg/dL Final     Alkaline Phosphatase 06/02/2022 70  45 - 120 U/L Final     AST 06/02/2022 27  0 - 40 U/L Final     ALT 06/02/2022 34  0 - 45 U/L Final     Protein Total 06/02/2022 8.0  6.0 - 8.0 g/dL Final     Albumin 06/02/2022 4.2  3.5 - 5.0 g/dL Final     Bilirubin Total 06/02/2022 0.7  0.0 - 1.0 mg/dL Final     GFR Estimate 06/02/2022 88  >60 mL/min/1.73m2 Final     Effective December 21, 2021 eGFRcr in adults is calculated using the 2021 CKD-EPI creatinine equation which includes age and gender (Gary et al., NEJ, DOI: 10.1056/GSKJmr6146486)     WBC Count 06/02/2022 8.3  4.0 - 11.0 10e3/uL Final     RBC Count 06/02/2022 4.41  3.80 - 5.20 10e6/uL Final     Hemoglobin 06/02/2022 13.3  11.7 - 15.7 g/dL Final     Hematocrit 06/02/2022 39.6  35.0 - 47.0 % Final     MCV 06/02/2022 90  78 - 100 fL Final     MCH 06/02/2022 30.2  26.5 - 33.0 pg Final     MCHC 06/02/2022 33.6  31.5 - 36.5 g/dL Final     RDW 06/02/2022 12.3  10.0 - 15.0 % Final     Platelet Count 06/02/2022 280  150 - 450 10e3/uL Final     TSH 06/02/2022 2.48  0.30 - 5.00 uIU/mL Final     Other HR HPV 06/02/2022 Negative  Negative Final     HPV16 DNA 06/02/2022 Negative  Negative Final     HPV18 DNA 06/02/2022 Negative  Negative Final     FINAL DIAGNOSIS 06/02/2022    Final                    Value:This result contains rich text formatting which cannot be displayed here.     Chlamydia Trachomatis 06/02/2022 Negative  Negative Final    Negative for C. trachomatis rRNA by transcription mediated amplification.   A negative result by transcription mediated amplification does not preclude the presence of infection because results are dependent on proper and adequate collection, absence of inhibitors and sufficient rRNA to be detected.     Neisseria gonorrhoeae 06/02/2022 Negative  Negative Final    Negative for N. gonorrhoeae rRNA by transcription mediated amplification. A negative result by transcription mediated amplification does not preclude the presence of C. trachomatis infection because results are dependent on proper and adequate collection, absence of inhibitors and sufficient rRNA to be detected.     Hepatitis C log 06/02/2022 7.2   Final     Hepatitis C RNA IU/mL, Instrument 06/02/2022 14,399,850 (A) <1 IU/mL Final     Hepatitis C High Resolution 06/02/2022 6m   Final    Comment: INTERPRETIVE INFORMATION:  Hepatitis C High Resolution   Genotype     Hepatitis C viral RNA is assayed using reverse   transcription polymerase chain reaction (RT-PCR) to amplify   specific portions of both the Core and NS5B regions of the   viral genome. The amplified nucleic acid is sequenced   bi-directionally using dye-terminator chemistry ([employer]).   Sequencing data is compared to a database of characterized   sequences.     Isolates of hepatitis C virus are grouped into six major   genotypes(1-6). These genotypes are subtyped according to   sequence characteristics. Sequencing both the Core and NS5B   regions allows for subtyping of all confirmed and most   provisional genotypes, including differentiation of 1a from   1b and typing of genotype 6.    This test was developed and its performance characteristics   determined by [facility]. It has not been cleared or   approved by the US Food and Drug Administration. This test   was performed in a CLIA certified laboratory and is                              intended for clinical purposes.  Performed By: [facility]  [address] [place] [id number]  : Karlee Medina MD                   .  ..

## 2022-07-20 ENCOUNTER — TELEPHONE (OUTPATIENT)
Dept: FAMILY MEDICINE | Facility: CLINIC | Age: 47
End: 2022-07-20

## 2022-07-20 NOTE — TELEPHONE ENCOUNTER
RN attempted to contact patient, but no answer. Unable to leave a message on voicemail. Will try patient later.    If patient calls back, please relay message below from Dr Hankins.    Heidy Palacios RN  [facility]      ----- Message from Wanda Hankins MD sent at 7/20/2022  8:53 AM CDT -----  Liver transaminases were just mildly elevated, likely from hepatitis C, hopefully she did receive a call to schedule an appointment with a gastroenterologist to start treatment.  She is immune against hepatitis a and B.

## 2022-07-20 NOTE — LETTER
July 22, 2022      Yoli Cutler [place] Se[place]  [address] [place] [address]        Dear WALTER Kent [facility] has made a couple attempts to contact you by phone. We were unable to leave a voicemail message. Below is a message from Dr Hankins. Please call clinic back at [phone] with questions or concerns.    Liver transaminases were just mildly elevated, likely from hepatitis C, hopefully you did receive a call to schedule an appointment with a gastroenterologist to start treatment.  You are immune against hepatitis a and B.    Sincerely,      Wanda Drummond [facility] RN staff

## 2022-07-21 NOTE — TELEPHONE ENCOUNTER
RN attempt to call pt with the help of a Vietnamese .     No answer. Unable to leave . Will attempt to call pt another time.    SENDY MaeN, RN   [facility]

## 2022-07-22 NOTE — TELEPHONE ENCOUNTER
RN made 3rd attempt to call pt with the help of a Corine .     No answer. Unable to leave VM. Will attempt to call pt another time.    Per protocol, letter mailed to patient's home address.    Heidy Palacios RN  [facility]

## 2022-07-26 NOTE — TELEPHONE ENCOUNTER
RECORDS RECEIVED FROM: [employer] [facility] Date: 08.04.2022   NOTES STATUS DETAILS   OFFICE NOTE from referring provider Internal 07.18.202 Wanda Hankins MD   OFFICE NOTES from other specialists     DISCHARGE SUMMARY from hospital     MEDICATION LIST     LIVER BIOSPY (IF APPLICABLE)      PATHOLOGY REPORTS      IMAGING     ENDOSCOPY (IF AVAILABLE)     COLONOSCOPY (IF AVAILABLE)     ULTRASOUND LIVER     CT OF ABDOMEN     MRI OF LIVER     FIBROSCAN, US ELASTOGRAPHY, FIBROSIS SCAN, MR ELASTOGRAPHY     LABS     HEPATIC PANEL (LIVER PANEL) Internal 07.18.2022   BASIC METABOLIC PANEL     COMPLETE METABOLIC PANEL Internal 06.02.2022   COMPLETE BLOOD COUNT (CBC) Internal 06.02.2022   INTERNATIONAL NORMALIZED RATIO (INR)     HEPATITIS C ANTIBODY Internal 06.02.2022   HEPATITIS C VIRAL LOAD/PCR     HEPATITIS C GENOTYPE     HEPATITIS B SURFACE ANTIGEN Internal 07.18.2022   HEPATITIS B SURFACE ANTIBODY Internal 07.18.2022   HEPATITIS B DNA QUANT LEVEL     HEPATITIS B CORE ANTIBODY

## 2022-07-27 ENCOUNTER — DOCUMENTATION ONLY (OUTPATIENT)
Dept: LAB | Facility: CLINIC | Age: 47
End: 2022-07-27

## 2022-07-27 DIAGNOSIS — Z11.59 ENCOUNTER FOR SCREENING FOR VIRAL DISEASE: ICD-10-CM

## 2022-07-27 DIAGNOSIS — B18.2 CHRONIC HEPATITIS C WITHOUT HEPATIC COMA (H): Primary | ICD-10-CM

## 2022-08-01 ENCOUNTER — LAB (OUTPATIENT)
Dept: LAB | Facility: CLINIC | Age: 47
End: 2022-08-01
Payer: COMMERCIAL

## 2022-08-01 DIAGNOSIS — Z11.59 ENCOUNTER FOR SCREENING FOR VIRAL DISEASE: ICD-10-CM

## 2022-08-01 DIAGNOSIS — B18.2 CHRONIC HEPATITIS C WITHOUT HEPATIC COMA (H): ICD-10-CM

## 2022-08-01 LAB
ANION GAP SERPL CALCULATED.3IONS-SCNC: 11 MMOL/L (ref 7–15)
BUN SERPL-MCNC: 10.2 MG/DL (ref 6–20)
CALCIUM SERPL-MCNC: 9.4 MG/DL (ref 8.6–10)
CHLORIDE SERPL-SCNC: 106 MMOL/L (ref 98–107)
CREAT SERPL-MCNC: 0.76 MG/DL (ref 0.51–0.95)
DEPRECATED HCO3 PLAS-SCNC: 24 MMOL/L (ref 22–29)
ERYTHROCYTE [DISTWIDTH] IN BLOOD BY AUTOMATED COUNT: 12.3 % (ref 10–15)
GFR SERPL CREATININE-BSD FRML MDRD: >90 ML/MIN/1.73M2
GLUCOSE SERPL-MCNC: 157 MG/DL (ref 70–99)
HCT VFR BLD AUTO: 40.6 % (ref 35–47)
HGB BLD-MCNC: 13.5 G/DL (ref 11.7–15.7)
MCH RBC QN AUTO: 30 PG (ref 26.5–33)
MCHC RBC AUTO-ENTMCNC: 33.3 G/DL (ref 31.5–36.5)
MCV RBC AUTO: 90 FL (ref 78–100)
PLATELET # BLD AUTO: 286 10E3/UL (ref 150–450)
POTASSIUM SERPL-SCNC: 4.2 MMOL/L (ref 3.4–5.3)
RBC # BLD AUTO: 4.5 10E6/UL (ref 3.8–5.2)
SODIUM SERPL-SCNC: 141 MMOL/L (ref 136–145)
WBC # BLD AUTO: 9.5 10E3/UL (ref 4–11)

## 2022-08-01 PROCEDURE — 85027 COMPLETE CBC AUTOMATED: CPT

## 2022-08-01 PROCEDURE — 86704 HEP B CORE ANTIBODY TOTAL: CPT

## 2022-08-01 PROCEDURE — 82248 BILIRUBIN DIRECT: CPT

## 2022-08-01 PROCEDURE — 87522 HEPATITIS C REVRS TRNSCRPJ: CPT

## 2022-08-01 PROCEDURE — 80053 COMPREHEN METABOLIC PANEL: CPT

## 2022-08-01 PROCEDURE — 36415 COLL VENOUS BLD VENIPUNCTURE: CPT

## 2022-08-01 PROCEDURE — 85610 PROTHROMBIN TIME: CPT

## 2022-08-02 LAB
ALBUMIN SERPL BCG-MCNC: 4.8 G/DL (ref 3.5–5.2)
ALP SERPL-CCNC: 70 U/L (ref 35–104)
ALT SERPL W P-5'-P-CCNC: 26 U/L (ref 10–35)
AST SERPL W P-5'-P-CCNC: 31 U/L (ref 10–35)
BILIRUB DIRECT SERPL-MCNC: <0.2 MG/DL (ref 0–0.3)
BILIRUB SERPL-MCNC: 0.4 MG/DL
HBV CORE AB SERPL QL IA: NONREACTIVE
INR PPP: 0.96 (ref 0.85–1.15)
PROT SERPL-MCNC: 8 G/DL (ref 6.4–8.3)

## 2022-08-04 ENCOUNTER — VIRTUAL VISIT (OUTPATIENT)
Dept: GASTROENTEROLOGY | Facility: CLINIC | Age: 47
End: 2022-08-04
Attending: FAMILY MEDICINE
Payer: COMMERCIAL

## 2022-08-04 ENCOUNTER — PRE VISIT (OUTPATIENT)
Dept: GASTROENTEROLOGY | Facility: CLINIC | Age: 47
End: 2022-08-04

## 2022-08-04 DIAGNOSIS — B18.2 CHRONIC HEPATITIS C WITHOUT HEPATIC COMA (H): ICD-10-CM

## 2022-08-04 LAB
HCV RNA SERPL NAA+PROBE-ACNC: ABNORMAL IU/ML
HCV RNA SERPL NAA+PROBE-LOG IU: 7.4 {LOG_IU}/ML

## 2022-08-04 PROCEDURE — 99204 OFFICE O/P NEW MOD 45 MIN: CPT | Mod: 95 | Performed by: PHYSICIAN ASSISTANT

## 2022-08-04 PROCEDURE — G0463 HOSPITAL OUTPT CLINIC VISIT: HCPCS | Mod: PN,RTG | Performed by: PHYSICIAN ASSISTANT

## 2022-08-04 RX ORDER — VELPATASVIR AND SOFOSBUVIR 100; 400 MG/1; MG/1
1 TABLET, FILM COATED ORAL DAILY
Qty: 28 TABLET | Refills: 2 | Status: SHIPPED | OUTPATIENT
Start: 2022-08-04

## 2022-08-04 NOTE — LETTER
8/4/2022         RE: Yoli Diallo  [address] [place] [address]        Dear Colleague,    Thank you for referring your patient, Yoli Diallo, to the [facility]. Please see a copy of my visit note below.    Hepatology Clinic Note  Yoli Diallo   Date of Birth 1975  Date of Service 8/3/2022    REASON FOR CONSULTATION: Hepatitis C  REFERRING PROVIDER: Wanda Hankins MD          Assessment/plan:   Yoli Diallo is a 47 year old female with Hepatitis C, genotype 6, treatment naive. Currently there are no biochemical or physical signs of cirrhosis. We discussed the natural course of Hepatitis C virus and the benefits of treating the disease. We discussed the treatment regimen and medication side effects. Patient would be an excellent candidate for Hepatitis C treatment to prevent worsening fibrosis and to prevent extrahepatic manifestations of the disease.    - Fibroscan evaluate for any degree of fibrosis in the future. Low suspicion of any significant fibrosis.   - Will plan to send prescription for Hepatitis C: Mavyret x 8   - Repeat HCV RNA at the end of treatment and 12-weeks after finishing treatment to determine SVR  - If patient achieves SVR and Fibrosis Stage 2 or less, she does not need regular follow-up in Hepatology Clinic.   - Follow-up in Hepatology Clinic as needed    López Valdez PA-C   [facility]    Total time for E/M services performed on the date of the encounter 45 minutes.  This included review of previous: clinic visits, hospital records, lab results, imaging studies, and procedural documentation. Time also includes patient visit, documentation and discussion with other providers.  The findings from this review are summarized in the above note.   -----------------------------------------------------       HPI:   Yoli Diallo is a 47 year old female  presenting for evaluation and  treatment of Hepatitis C. She is joined by [employer]  on phone.     Hepatitis C   -Genotype 6  -Diagnosed: 2022  -History: previous house-mate with Hep C  -Prior biopsy: No   -Prior treatments: naive    Patient zuri diagnosed with Hepatitis C recently with routine screening with her primary care provider. She is not sure how she may have acquired the virus, but did liver with a friend 10 years ago with the virus.     Her appetite is good. Weight is stable. She has regular bowel movements.     Patient denies jaundice, lower extremity edema, abdominal distension or confusion.      Patient also denies melena, hematochezia or hematemesis.    Patient denies weight loss, fevers, sweats or chills.    PMH: chronic hepatitis C     SMH: no previous surgical history     Medications:   none    No previous tobacco use. No history of alcohol use. No previous IV/IN drug use.  Currently work as meat processing. Patient currently lives by self. No known family history of liver disease or liver cancer.     Lab work-up thus far:   HCV RNA 22 million  HBV SAb: 152 - immune  HBV SAg: nonreactive  HBV CAb: negative  HIV: nonreactive    Medical hx Surgical hx   No past medical history on file. No past surgical history on file.              Medications:     No current outpatient medications on file.     No current facility-administered medications for this visit.            Allergies:   No Known Allergies         Social History:     Social History     Socioeconomic History     Marital status:      Spouse name: Not on file     Number of children: Not on file     Years of education: Not on file     Highest education level: Not on file   Occupational History     Not on file   Tobacco Use     Smoking status: Never Smoker     Smokeless tobacco: Never Used   Substance and Sexual Activity     Alcohol use: Never     Drug use: Never     Sexual activity: Not on file   Other Topics Concern     Not on file   Social History Narrative      Not on file     Social Determinants of Health     Financial Resource Strain: Not on file   Food Insecurity: Not on file   Transportation Needs: Not on file   Physical Activity: Not on file   Stress: Not on file   Social Connections: Not on file   Intimate Partner Violence: Not on file   Housing Stability: Not on file            Family History:   No family history on file.         Review of Systems:   GEN: See HPI  HEENT: No change in vision or hearing, mouth sores, dysphagia, lymph nodes  Resp: No shortness of breath, coughing, hx of asthma  CV: No chest pain, palpitations, syncope   GI: See HPI  : No dysuria, history of stones, urine color    Skin: No rash; no pruritus or psoriasis  MS: No arthralgias, myalgias, joint swelling  Neuro: No memory changes, confusion, numbness    Heme: No difficulty clotting, bruising, bleeding  Psych:  No anxiety, depression, agitation          Physical Exam:     GENERAL: healthy, alert and no distress  EYES: Eyes grossly normal to inspection, conjunctivae and sclerae normal  RESP: no audible wheeze, cough, or visible cyanosis.  No visible retractions or increased work of breathing.  Able to speak fully in complete sentences  NEURO: Cranial nerves grossly intact, mentation intact and speech normal  PSYCH: mentation appears normal, affect normal/bright, judgement and insight intact, normal speech and appearance well-groomed         Data:   Reviewed in person and significant for:    Lab Results   Component Value Date     08/01/2022      Lab Results   Component Value Date    POTASSIUM 4.2 08/01/2022    POTASSIUM 4.3 06/02/2022     Lab Results   Component Value Date    CHLORIDE 106 08/01/2022    CHLORIDE 102 06/02/2022     Lab Results   Component Value Date    CO2 24 08/01/2022    CO2 30 06/02/2022     Lab Results   Component Value Date    BUN 10.2 08/01/2022    BUN 16 06/02/2022     Lab Results   Component Value Date    CR 0.76 08/01/2022       Lab Results   Component Value  Date    WBC 9.5 08/01/2022     Lab Results   Component Value Date    HGB 13.5 08/01/2022     Lab Results   Component Value Date    HCT 40.6 08/01/2022     Lab Results   Component Value Date    MCV 90 08/01/2022     Lab Results   Component Value Date     08/01/2022       Lab Results   Component Value Date    AST 31 08/01/2022     Lab Results   Component Value Date    ALT 26 08/01/2022     No results found for: BILICONJ   Lab Results   Component Value Date    BILITOTAL 0.4 08/01/2022       Lab Results   Component Value Date    ALBUMIN 4.8 08/01/2022    ALBUMIN 4.2 06/02/2022     Lab Results   Component Value Date    PROTTOTAL 8.0 08/01/2022      Lab Results   Component Value Date    ALKPHOS 70 08/01/2022       Lab Results   Component Value Date    INR 0.96 08/01/2022     No recent abdominal imaging      Again, thank you for allowing me to participate in the care of your patient.        Sincerely,        López Valdez PA-C

## 2022-08-04 NOTE — PROGRESS NOTES
Yoli is a 47 year old who is being evaluated via a billable video visit.  .    How would you like to obtain your AVS? MyChart  If the video visit is dropped, the invitation should be resent by: Text to cell phone: [phone]  Will anyone else be joining your video visit? No      Video-Visit Details    Video Start Time: 10:37 am     Type of service:  Video Visit    Video End Time: 11:07 am     Originating Location (pt. Location): Home    Distant Location (provider location):  [facility] [place] .    Platform used for Video Visit: [employer]

## 2022-08-04 NOTE — PROGRESS NOTES
Hepatology Clinic Note  Yoli Diallo   Date of Birth 1975  Date of Service 8/3/2022    REASON FOR CONSULTATION: Hepatitis C  REFERRING PROVIDER: Wanda Hankins MD          Assessment/plan:   Yoli Diallo is a 47 year old female with Hepatitis C, genotype 6, treatment naive. Currently there are no biochemical or physical signs of cirrhosis. We discussed the natural course of Hepatitis C virus and the benefits of treating the disease. We discussed the treatment regimen and medication side effects. Patient would be an excellent candidate for Hepatitis C treatment to prevent worsening fibrosis and to prevent extrahepatic manifestations of the disease.    - Fibroscan evaluate for any degree of fibrosis in the future. Low suspicion of any significant fibrosis.   - Will plan to send prescription for Hepatitis C: Mavyret x 8   - Repeat HCV RNA at the end of treatment and 12-weeks after finishing treatment to determine SVR  - If patient achieves SVR and Fibrosis Stage 2 or less, she does not need regular follow-up in Hepatology Clinic.   - Follow-up in Hepatology Clinic as needed    López Valdez PA-C   [facility]    Total time for E/M services performed on the date of the encounter 45 minutes.  This included review of previous: clinic visits, hospital records, lab results, imaging studies, and procedural documentation. Time also includes patient visit, documentation and discussion with other providers.  The findings from this review are summarized in the above note.   -----------------------------------------------------       HPI:   Yoli Diallo is a 47 year old female  presenting for evaluation and treatment of Hepatitis C. She is joined by [employer]  on phone.     Hepatitis C   -Genotype 6  -Diagnosed: 2022  -History: previous house-mate with Hep C  -Prior biopsy: No   -Prior treatments: naive    Patient wad diagnosed with Hepatitis C recently with routine screening with  her primary care provider. She is not sure how she may have acquired the virus, but did liver with a friend 10 years ago with the virus.     Her appetite is good. Weight is stable. She has regular bowel movements.     Patient denies jaundice, lower extremity edema, abdominal distension or confusion.      Patient also denies melena, hematochezia or hematemesis.    Patient denies weight loss, fevers, sweats or chills.    PMH: chronic hepatitis C     SMH: no previous surgical history     Medications:   none    No previous tobacco use. No history of alcohol use. No previous IV/IN drug use.  Currently work as meat processing. Patient currently lives by self. No known family history of liver disease or liver cancer.     Lab work-up thus far:   HCV RNA 22 million  HBV SAb: 152 - immune  HBV SAg: nonreactive  HBV CAb: negative  HIV: nonreactive    Medical hx Surgical hx   No past medical history on file. No past surgical history on file.              Medications:     No current outpatient medications on file.     No current facility-administered medications for this visit.            Allergies:   No Known Allergies         Social History:     Social History     Socioeconomic History     Marital status:      Spouse name: Not on file     Number of children: Not on file     Years of education: Not on file     Highest education level: Not on file   Occupational History     Not on file   Tobacco Use     Smoking status: Never Smoker     Smokeless tobacco: Never Used   Substance and Sexual Activity     Alcohol use: Never     Drug use: Never     Sexual activity: Not on file   Other Topics Concern     Not on file   Social History Narrative     Not on file     Social Determinants of Health     Financial Resource Strain: Not on file   Food Insecurity: Not on file   Transportation Needs: Not on file   Physical Activity: Not on file   Stress: Not on file   Social Connections: Not on file   Intimate Partner Violence: Not on file    Housing Stability: Not on file            Family History:   No family history on file.         Review of Systems:   GEN: See HPI  HEENT: No change in vision or hearing, mouth sores, dysphagia, lymph nodes  Resp: No shortness of breath, coughing, hx of asthma  CV: No chest pain, palpitations, syncope   GI: See HPI  : No dysuria, history of stones, urine color    Skin: No rash; no pruritus or psoriasis  MS: No arthralgias, myalgias, joint swelling  Neuro: No memory changes, confusion, numbness    Heme: No difficulty clotting, bruising, bleeding  Psych:  No anxiety, depression, agitation          Physical Exam:     GENERAL: healthy, alert and no distress  EYES: Eyes grossly normal to inspection, conjunctivae and sclerae normal  RESP: no audible wheeze, cough, or visible cyanosis.  No visible retractions or increased work of breathing.  Able to speak fully in complete sentences  NEURO: Cranial nerves grossly intact, mentation intact and speech normal  PSYCH: mentation appears normal, affect normal/bright, judgement and insight intact, normal speech and appearance well-groomed         Data:   Reviewed in person and significant for:    Lab Results   Component Value Date     08/01/2022      Lab Results   Component Value Date    POTASSIUM 4.2 08/01/2022    POTASSIUM 4.3 06/02/2022     Lab Results   Component Value Date    CHLORIDE 106 08/01/2022    CHLORIDE 102 06/02/2022     Lab Results   Component Value Date    CO2 24 08/01/2022    CO2 30 06/02/2022     Lab Results   Component Value Date    BUN 10.2 08/01/2022    BUN 16 06/02/2022     Lab Results   Component Value Date    CR 0.76 08/01/2022       Lab Results   Component Value Date    WBC 9.5 08/01/2022     Lab Results   Component Value Date    HGB 13.5 08/01/2022     Lab Results   Component Value Date    HCT 40.6 08/01/2022     Lab Results   Component Value Date    MCV 90 08/01/2022     Lab Results   Component Value Date     08/01/2022       Lab Results    Component Value Date    AST 31 08/01/2022     Lab Results   Component Value Date    ALT 26 08/01/2022     No results found for: BILICONJ   Lab Results   Component Value Date    BILITOTAL 0.4 08/01/2022       Lab Results   Component Value Date    ALBUMIN 4.8 08/01/2022    ALBUMIN 4.2 06/02/2022     Lab Results   Component Value Date    PROTTOTAL 8.0 08/01/2022      Lab Results   Component Value Date    ALKPHOS 70 08/01/2022       Lab Results   Component Value Date    INR 0.96 08/01/2022     No recent abdominal imaging

## 2022-08-08 ENCOUNTER — TELEPHONE (OUTPATIENT)
Dept: GASTROENTEROLOGY | Facility: CLINIC | Age: 47
End: 2022-08-08

## 2022-08-08 NOTE — TELEPHONE ENCOUNTER
I see that a FibroScan was ordered, was this completed/resulted? If not, just wanted to verify we are treating as non-cirrhotic? Please advise, thank you!

## 2022-08-09 NOTE — TELEPHONE ENCOUNTER
PA Initiation    Medication: Mavyret  Insurance Company: [employer] - Phone [phone] Fax [phone]  Pharmacy Filling the Rx: [facility] - MAIL ORDER ONLY - [place], [place]  Filling Pharmacy Phone: [phone]  Filling Pharmacy Fax:    Start Date: 8/9/2022

## 2022-08-19 NOTE — TELEPHONE ENCOUNTER
Called [facility] to confirm receipt of prescription. They attempted first contact today to schedule delivery, state they will make second attempt early next week.    Ailin Gonzalez RN Care Coordinator  [facility]  Hepatology Clinic/Specialty Program

## 2022-08-22 NOTE — TELEPHONE ENCOUNTER
Called pharmacy to check on delivery status, they stated that they reached pt today but that pt would like to wait until they have co-pay assistance to schedule delivery. Pharmacy states they will have staff reach out to pt tomorrow regarding co-pay assistance.    iAlin Gonzalez RN Care Coordinator  [facility]  Hepatology Clinic/Specialty Program

## 2022-08-24 NOTE — TELEPHONE ENCOUNTER
Called pharmacy - delivery scheduled for tomorrow.    Ailin Gonzalez, RN Care Coordinator  [facility]  Hepatology Clinic/Specialty Program

## 2022-08-25 ENCOUNTER — CARE COORDINATION (OUTPATIENT)
Dept: GASTROENTEROLOGY | Facility: CLINIC | Age: 47
End: 2022-08-25

## 2022-08-25 DIAGNOSIS — B18.2 CHRONIC HEPATITIS C WITHOUT HEPATIC COMA (H): Primary | ICD-10-CM

## 2022-08-25 NOTE — PROGRESS NOTES
Connected with patient for f/u on Hep C treatment delivery/start status. Patient received their Epclusa medication and are ready to start treatment. Patient will be on Hep C treatment for 8 weeks. Patient reports no recent changes in health, hospitalizations or recent changes in medications. Patient did discuss with a pharmacist. Reviewed the following Hep C POC and education with patient.     Hepatitis C Treatment  Treatment: Epclusa x 8 weeks  Genotype: 6  Stage Fibrosis:  (no biochemical or physical signs of cirrhosis)  Previous Treatment Outcome: Naive    Please have labs drawn as close to the date indicated at an [facility].    Start Date: 08/27/22    Week 8-End of Treatment: 10/22/2022    3 Months Post Treatment  HCV RNA Quant Lab due: 1/20/2023    Please have this lab drawn on the specified date of 1/20/23 or within a week after. This final lab will determine if you have cleared the Hepatitis C virus with Epclusa treatment. Without this final lab, we will be unable to determine if treatment was successful. You do not need to fast for this lab.             Educational information to patient on Hep C treatment:     -Contact the [facility] Hepatology clinic and speak with clinical RN prior to starting any new prescribed or OTC medications.   -Take medications exactly as prescribed, do not change dose or stop taking without consulting your provider.   -Take Medication one time each day with food  -If you miss a dose of medication, then take it as soon as you remember on the same day. If not remembered on the same day, then skip the dose and take the next dose at the usual time. Do not take more than the recommended dose. Contact the clinic if you miss a dose.    Please contact the pharmacy 1-2 weeks prior to needing a medication refill.      Side Effects  The most common side effects of Hep C medication treatment can include:  -tiredness  -headache  -nausea  Notify the clinic of any side effects that  bother you or that do not go away.    Contact clinic for rash, itching or unmanageable nausea.     How to store Hep C Treatment Medications  -Store Medication at room temperature below 86 degrees F  -Keep Medication in it's original container  -Do not use Medication if the seal is broken or missing     General information  It is not known if treatment will prevent you from infecting another person or reinfecting yourself with the hepatitis C virus during treatment. It is best that as soon as you start treatment to buy a new toothbrush, disposable razors (if you use a rotating shaver you do not need to buy a new one) and nail clippers. If you wear dentures, you should soak your dentures in 70-90% Isopropyl solution for 5 minutes one time within the first week of starting treatment. After dentures are done soaking, rinse your dentures off thoroughly with water. If you check your blood sugar at home, please dispose of the fingerstick needle after each use and DO NOT REUSE the insulin needles. These items should not be shared with anyone.          If you have any questions, please contact the main clinic at [phone] or your clinical RN at [phone]. We appreciate you choosing the [facility] for your treatment. Patient agrees to Hep C treatment POC and verbalizes understanding. Patient will receive a copy of treatment plan in the mail, address verified with patient. Patient has no further questions or concerns. Hep C care team updated on patient status.    Ailin Gonzalez RN Care Coordinator  [facility]  Hepatology Clinic/Specialty Program

## 2022-08-25 NOTE — LETTER
August 26, 2022       TO: Yoli Omayra CYNDY Diallo  [address] [place] [address]       Dear ,    Hepatitis C Treatment  Treatment: Epclusa x 8 weeks    Please have labs drawn as close to the date indicated at an [facility].    Start Date: 08/27/22    Week 8-End of Treatment: 10/22/2022    3 Months Post Treatment  Lab due: 1/20/2023    Please have this lab drawn on the specified date of 1/20/23 or within a week after. This final lab will determine if you have cleared the Hepatitis C virus with Epclusa treatment. Without this final lab, we will be unable to determine if treatment was successful. You do not need to fast for this lab.       Educational information to patient on Hep C treatment:     -Contact the [facility] Hepatology clinic and speak with clinical RN prior to starting any new prescribed or OTC medications.   -Take medications exactly as prescribed, do not change dose or stop taking without consulting your provider.   -Take Medication one time each day with food  -If you miss a dose of medication, then take it as soon as you remember on the same day. If not remembered on the same day, then skip the dose and take the next dose at the usual time. Do not take more than the recommended dose. Contact the clinic if you miss a dose.    Please contact the pharmacy 1-2 weeks prior to needing a medication refill.      Side Effects  The most common side effects of Hep C medication treatment can include:  -tiredness  -headache  -nausea  Notify the clinic of any side effects that bother you or that do not go away.    Contact clinic for rash, itching or unmanageable nausea.     How to store Hep C Treatment Medications  -Store Medication at room temperature below 86 degrees F  -Keep Medication in it's original container  -Do not use Medication if the seal is broken or missing     General information  It is not known if treatment will prevent you from infecting another person or  reinfecting yourself with the hepatitis C virus during treatment. It is best that as soon as you start treatment to buy a new toothbrush, disposable razors (if you use a rotating shaver you do not need to buy a new one) and nail clippers. If you wear dentures, you should soak your dentures in 70-90% Isopropyl solution for 5 minutes one time within the first week of starting treatment. After dentures are done soaking, rinse your dentures off thoroughly with water. If you check your blood sugar at home, please dispose of the fingerstick needle after each use and DO NOT REUSE the insulin needles. These items should not be shared with anyone.          If you have any questions, please contact the main clinic at [phone] or your clinical RN at [phone]. We appreciate you choosing the [facility] for your treatment.    Ailin Gonzalez RN Care Coordinator  [facility]  Hepatology Clinic/Specialty Program

## 2022-09-16 DIAGNOSIS — B18.2 CHRONIC HEPATITIS C WITHOUT HEPATIC COMA (H): ICD-10-CM

## 2022-09-16 PROCEDURE — 91200 LIVER ELASTOGRAPHY: CPT | Mod: 26 | Performed by: PHYSICIAN ASSISTANT

## 2022-10-03 ENCOUNTER — HEALTH MAINTENANCE LETTER (OUTPATIENT)
Age: 47
End: 2022-10-03

## 2022-10-07 ENCOUNTER — TELEPHONE (OUTPATIENT)
Dept: GASTROENTEROLOGY | Facility: CLINIC | Age: 47
End: 2022-10-07

## 2022-10-07 NOTE — TELEPHONE ENCOUNTER
Health Call Center    Phone Message    May a detailed message be left on voicemail: yes     Reason for Call: Medication Question or concern regarding medication   Prescription Clarification  Name of Medication: sofosbuvir-velpatasvir (EPCLUSA) 400-100 MG per tablet  Prescribing Provider: López Valdez PA-C   Pharmacy: [facility] - [place], [place] - [address]    What on the order needs clarification? Patient was told to do 8 weeks of treatment, but the prescription is for 12 weeks. The patient refused to take the last 4 weeks, and the pharmacy would like to know how the provider would like to proceed. Please call them back at [phone] Opt 2, thank you!          Action Taken: Message routed to:  [facility]    Travel Screening: Not Applicable

## 2022-10-10 NOTE — TELEPHONE ENCOUNTER
ADDENDUM: 10/13/22 @ 10:30 am Selina from [facility] called requesting call back choose option 2 after calling [phone]. Pt states she only needs to take 8 weeks of her sofosbuvir-velpatasvir (EPCLUSA) 400-100 MG per tablet but it was written out for 12 weeks. Please call back to confirm. 3rd request.              [facility]     Phone Message     May a detailed message be left on voicemail: yes      Reason for Call: Medication Question or concern regarding medication   Prescription Clarification  Name of Medication: sofosbuvir-velpatasvir (EPCLUSA) 400-100 MG per tablet  Prescribing Provider: López Valdez PA-C              Pharmacy: [facility]               What on the order needs clarification? Patient was told to do 8 weeks of treatment, but the prescription is for 12 weeks. The patient refused to take the last 4 weeks, and the pharmacy would like to know how the provider would like to proceed. Please call them back at [phone], option #2.  This is the 2nd request.      Action Taken: Message routed to:  [facility]     Travel Screening:  Not applicable

## 2023-01-18 ENCOUNTER — TELEPHONE (OUTPATIENT)
Dept: GASTROENTEROLOGY | Facility: CLINIC | Age: 48
End: 2023-01-18
Payer: COMMERCIAL

## 2023-01-18 NOTE — TELEPHONE ENCOUNTER
Pt reached out to writer today to inform that she has scheduled her post hepatitis C treatment lab for Friday 1/20. Pt confirmed that she only took 8 weeks of her 12 week course. Writer explained to pt with the aid of Romanian  that it had been an error that she was told at the beginning of her treatment course that she was to take 8 weeks of Epclusa and that she was supposed to be on it for 12. Writer and pharmacy had been unable to reach pt regarding this back in October/2022 after repeated attempts.    Discussed with pt that it's possible she may clear the virus with partial treatment, but may need to address re-treatment if not. Pt verbalized understanding. Will plan to have pt check HCV RNA on 1/20 and go from there.    Ailin Gonzalez, RN Care Coordinator  [facility]  Hepatology Clinic/Specialty Program

## 2023-01-20 ENCOUNTER — LAB (OUTPATIENT)
Dept: LAB | Facility: CLINIC | Age: 48
End: 2023-01-20
Payer: COMMERCIAL

## 2023-01-20 DIAGNOSIS — B18.2 CHRONIC HEPATITIS C WITHOUT HEPATIC COMA (H): ICD-10-CM

## 2023-01-20 PROCEDURE — 87522 HEPATITIS C REVRS TRNSCRPJ: CPT

## 2023-01-20 PROCEDURE — 36415 COLL VENOUS BLD VENIPUNCTURE: CPT

## 2023-01-23 LAB — HCV RNA SERPL NAA+PROBE-ACNC: NOT DETECTED IU/ML

## 2023-01-24 NOTE — TELEPHONE ENCOUNTER
Called pt with assistance of [employer]  #[phone] to notify that HCV RNA was negative, meaning treatment was successful and pt has been cured of hepatitis C. Cure letter sent. Pt verbalized understanding.    Ailin Gonzalez RN Care Coordinator  [facility]  Hepatology Clinic/Specialty Program

## 2023-06-13 NOTE — TELEPHONE ENCOUNTER
June 13, 2023     Patient: Cuba Guido   YOB: 2022   Date of Visit: 6/13/2023       To Whom it May Concern:    Cuba Guido was seen in my clinic on 6/13/2023 at 11:20 am.     Please excuse Cuba for his absence from school on the date listed above to be able to make his appointment.    Sincerely,         Nikolas Golden MD    Medical information is confidential and cannot be disclosed without the written consent of the patient or his representative.     Prior Authorization Approval    Authorization Effective Date: 7/10/2022  Authorization Expiration Date: 11/1/2022  Medication: Epclusa  Approved Dose/Quantity: 12 weeks  Reference #: [id number]   Insurance Company: [employer] - Phone [phone] Fax [phone]  Expected CoPay: Unknown, pt restricted     CoPay Card Available: Yes    Foundation Assistance Needed: No  Which Pharmacy is filling the prescription (Not needed for infusion/clinic administered): [facility]IL [facility] - [place], [place]  Pharmacy Notified: Yes  Patient Notified: Yes

## 2023-08-13 ENCOUNTER — HEALTH MAINTENANCE LETTER (OUTPATIENT)
Age: 48
End: 2023-08-13

## 2023-10-22 ENCOUNTER — HEALTH MAINTENANCE LETTER (OUTPATIENT)
Age: 48
End: 2023-10-22

## 2024-10-06 ENCOUNTER — HEALTH MAINTENANCE LETTER (OUTPATIENT)
Age: 49
End: 2024-10-06